# Patient Record
Sex: FEMALE | Race: WHITE
[De-identification: names, ages, dates, MRNs, and addresses within clinical notes are randomized per-mention and may not be internally consistent; named-entity substitution may affect disease eponyms.]

---

## 2019-08-20 ENCOUNTER — HOSPITAL ENCOUNTER (OUTPATIENT)
Dept: HOSPITAL 92 - LABBT | Age: 54
Discharge: HOME | End: 2019-08-20
Attending: SURGERY
Payer: COMMERCIAL

## 2019-08-20 DIAGNOSIS — Z01.818: Primary | ICD-10-CM

## 2019-08-20 DIAGNOSIS — K80.20: ICD-10-CM

## 2019-08-20 LAB
ALBUMIN SERPL BCG-MCNC: 4.5 G/DL (ref 3.5–5)
ALP SERPL-CCNC: 87 U/L (ref 40–150)
ALT SERPL W P-5'-P-CCNC: 14 U/L (ref 8–55)
AST SERPL-CCNC: 17 U/L (ref 5–34)
BILIRUB DIRECT SERPL-MCNC: 0.2 MG/DL (ref 0.1–0.3)
BILIRUB SERPL-MCNC: 0.6 MG/DL (ref 0.2–1.2)

## 2019-08-20 PROCEDURE — 93005 ELECTROCARDIOGRAM TRACING: CPT

## 2019-08-20 PROCEDURE — 93010 ELECTROCARDIOGRAM REPORT: CPT

## 2019-08-20 PROCEDURE — 80076 HEPATIC FUNCTION PANEL: CPT

## 2019-08-22 ENCOUNTER — HOSPITAL ENCOUNTER (OUTPATIENT)
Dept: HOSPITAL 92 - SDC | Age: 54
Discharge: HOME | End: 2019-08-22
Attending: SURGERY
Payer: COMMERCIAL

## 2019-08-22 VITALS — BODY MASS INDEX: 24.5 KG/M2

## 2019-08-22 DIAGNOSIS — Z88.5: ICD-10-CM

## 2019-08-22 DIAGNOSIS — K80.10: Primary | ICD-10-CM

## 2019-08-22 DIAGNOSIS — Z79.899: ICD-10-CM

## 2019-08-22 PROCEDURE — 88304 TISSUE EXAM BY PATHOLOGIST: CPT

## 2019-08-22 PROCEDURE — 0FT44ZZ RESECTION OF GALLBLADDER, PERCUTANEOUS ENDOSCOPIC APPROACH: ICD-10-PCS | Performed by: SURGERY

## 2019-08-22 NOTE — OP
DATE OF PROCEDURE:  08/22/2019



PREOPERATIVE DIAGNOSIS:  Symptomatic cholelithiasis.



PROCEDURE PERFORMED:  Laparoscopic cholecystectomy.



INDICATIONS:  A 54-year-old female, who has been having severe epigastric pain to

the back.  Ultrasound showed cholelithiasis. 



FINDINGS:  She had hydrops of the gallbladder with multiple large stones, small

caliber cystic duct, distention of the gallbladder. 



DESCRIPTION OF PROCEDURE:  After informed consent was obtained, the patient was

taken to the operating room, given general endotracheal anesthesia, placed in the

supine position.  Abdomen was prepped and draped in usual fashion. Local anesthesia

was infiltrated subcutaneously and deep.  A subumbilical incision was performed.

Subcu divided sharply.  The fascia grasped and 2 stay sutures of 0 Vicryl placed on

each side of midline.  Midline incised.  Digital palpation revealed no local

adhesions.  A blunt 12 mm trocar inserted, pneumoperitoneum was created to a

pressure of 15 mmHg.  A 0-degree laparoscope was inserted under direct vision.

Three 5 mm ports were placed subcostally.  Gallbladder was distended.  It was

grasped, advanced superiorly, peritoneum dissected distally to dissect out the

cystic duct and cystic artery in critical view.  The duct and artery were triply

ligated with hemoclips and divided.  The gallbladder removed from its fossa

utilizing electrocautery, removed from the abdomen through the umbilical port, and

sent to Pathology for further analysis.  Hemostasis achieved with electrocautery.

The abdomen irrigated.  Irrigation fluid removed.  Trocars and retractors removed.

The fascia closed with interrupted 0 Vicryl suture.  The skin closed with

interrupted 4-0 Rapide.  Dermabond applied.  The patient tolerated the procedure

well, transferred to Recovery in good condition.  Sponge and needle count verified

correct x2. 







Job ID:  [id number]

## 2019-08-23 ENCOUNTER — HOSPITAL ENCOUNTER (EMERGENCY)
Dept: HOSPITAL 92 - SCSER | Age: 54
Discharge: HOME | End: 2019-08-23
Payer: COMMERCIAL

## 2019-08-23 DIAGNOSIS — R33.9: Primary | ICD-10-CM

## 2019-08-23 LAB — RBC UR QL AUTO: (no result) HPF (ref 0–3)

## 2019-08-23 PROCEDURE — 81015 MICROSCOPIC EXAM OF URINE: CPT

## 2019-08-23 PROCEDURE — 81003 URINALYSIS AUTO W/O SCOPE: CPT

## 2019-08-23 PROCEDURE — 51701 INSERT BLADDER CATHETER: CPT

## 2020-05-18 ENCOUNTER — HOSPITAL ENCOUNTER (OUTPATIENT)
Dept: HOSPITAL 92 - SCSULT | Age: 55
Discharge: HOME | End: 2020-05-18
Attending: FAMILY MEDICINE
Payer: COMMERCIAL

## 2020-05-18 DIAGNOSIS — Z90.710: ICD-10-CM

## 2020-05-18 DIAGNOSIS — R10.2: Primary | ICD-10-CM

## 2020-05-18 PROCEDURE — 76856 US EXAM PELVIC COMPLETE: CPT

## 2020-05-18 NOTE — ULT
Pelvic sonogram transabdominal and transvaginal imaging



HISTORY: Pelvic pain.



FINDINGS: Urinary bladder is incompletely distended. No focal lesion.



Uterus and ovaries are surgically absent.



No pelvic mass or free fluid visible.



  



IMPRESSION :

Status post hysterectomy. No abnormalities are demonstrated.



Reported By: YAKELIN Britt 

Electronically Signed:  5/18/2020 10:37 AM

## 2021-05-19 ENCOUNTER — HOSPITAL ENCOUNTER (OUTPATIENT)
Dept: HOSPITAL 92 - LABBT | Age: 56
Discharge: HOME | End: 2021-05-19
Attending: SURGERY
Payer: COMMERCIAL

## 2021-05-19 DIAGNOSIS — Z01.818: Primary | ICD-10-CM

## 2021-05-19 DIAGNOSIS — D17.1: ICD-10-CM

## 2021-05-19 DIAGNOSIS — Z20.822: ICD-10-CM

## 2021-05-19 LAB
ALBUMIN SERPL BCG-MCNC: 4.4 G/DL (ref 3.5–5)
ALP SERPL-CCNC: 84 U/L (ref 40–110)
ALT SERPL W P-5'-P-CCNC: 13 U/L (ref 8–55)
ANION GAP SERPL CALC-SCNC: 12 MMOL/L (ref 10–20)
AST SERPL-CCNC: 22 U/L (ref 5–34)
BASOPHILS # BLD AUTO: 0.1 10X3/UL (ref 0–0.2)
BASOPHILS NFR BLD AUTO: 0.7 % (ref 0–2)
BILIRUB SERPL-MCNC: 0.5 MG/DL (ref 0.2–1.2)
BUN SERPL-MCNC: 11 MG/DL (ref 9.8–20.1)
CALCIUM SERPL-MCNC: 9.6 MG/DL (ref 7.8–10.44)
CHLORIDE SERPL-SCNC: 105 MMOL/L (ref 98–107)
CO2 SERPL-SCNC: 27 MMOL/L (ref 22–29)
CREAT CL PREDICTED SERPL C-G-VRATE: 0 ML/MIN (ref 70–130)
EOSINOPHIL # BLD AUTO: 0.3 10X3/UL (ref 0–0.5)
EOSINOPHIL NFR BLD AUTO: 3.7 % (ref 0–6)
GLOBULIN SER CALC-MCNC: 2.6 G/DL (ref 2.4–3.5)
GLUCOSE SERPL-MCNC: 65 MG/DL (ref 70–105)
HGB BLD-MCNC: 13.3 G/DL (ref 12–15.5)
LYMPHOCYTES NFR BLD AUTO: 26.4 % (ref 18–47)
MCH RBC QN AUTO: 31.5 PG (ref 27–33)
MCV RBC AUTO: 92.9 FL (ref 81.6–98.3)
MONOCYTES # BLD AUTO: 0.6 10X3/UL (ref 0–1.1)
MONOCYTES NFR BLD AUTO: 7.3 % (ref 0–10)
NEUTROPHILS # BLD AUTO: 4.6 10X3/UL (ref 1.5–8.4)
NEUTROPHILS NFR BLD AUTO: 61.4 % (ref 40–75)
PLATELET # BLD AUTO: 275 10X3/UL (ref 150–450)
POTASSIUM SERPL-SCNC: 4.3 MMOL/L (ref 3.5–5.1)
RBC # BLD AUTO: 4.22 10X6/UL (ref 3.9–5.03)
SODIUM SERPL-SCNC: 140 MMOL/L (ref 136–145)
WBC # BLD AUTO: 7.5 10X3/UL (ref 3.5–10.5)

## 2021-05-19 PROCEDURE — 93010 ELECTROCARDIOGRAM REPORT: CPT

## 2021-05-19 PROCEDURE — 87635 SARS-COV-2 COVID-19 AMP PRB: CPT

## 2021-05-19 PROCEDURE — U0003 INFECTIOUS AGENT DETECTION BY NUCLEIC ACID (DNA OR RNA); SEVERE ACUTE RESPIRATORY SYNDROME CORONAVIRUS 2 (SARS-COV-2) (CORONAVIRUS DISEASE [COVID-19]), AMPLIFIED PROBE TECHNIQUE, MAKING USE OF HIGH THROUGHPUT TECHNOLOGIES AS DESCRIBED BY CMS-2020-01-R: HCPCS

## 2021-05-19 PROCEDURE — 85025 COMPLETE CBC W/AUTO DIFF WBC: CPT

## 2021-05-19 PROCEDURE — 93005 ELECTROCARDIOGRAM TRACING: CPT

## 2021-05-19 PROCEDURE — U0005 INFEC AGEN DETEC AMPLI PROBE: HCPCS

## 2021-05-19 PROCEDURE — 80053 COMPREHEN METABOLIC PANEL: CPT

## 2021-05-24 ENCOUNTER — HOSPITAL ENCOUNTER (OUTPATIENT)
Dept: HOSPITAL 92 - SDC | Age: 56
Discharge: HOME | End: 2021-05-24
Attending: SURGERY
Payer: COMMERCIAL

## 2021-05-24 VITALS — BODY MASS INDEX: 25.4 KG/M2

## 2021-05-24 DIAGNOSIS — Z88.5: ICD-10-CM

## 2021-05-24 DIAGNOSIS — D17.1: Primary | ICD-10-CM

## 2021-05-24 PROCEDURE — S0020 INJECTION, BUPIVICAINE HYDRO: HCPCS

## 2021-05-24 PROCEDURE — 88304 TISSUE EXAM BY PATHOLOGIST: CPT

## 2021-05-24 PROCEDURE — 0JB70ZZ EXCISION OF BACK SUBCUTANEOUS TISSUE AND FASCIA, OPEN APPROACH: ICD-10-PCS | Performed by: SURGERY

## 2021-05-26 ENCOUNTER — HOSPITAL ENCOUNTER (OUTPATIENT)
Dept: HOSPITAL 92 - SDC | Age: 56
Discharge: HOME | End: 2021-05-26
Attending: NEUROLOGICAL SURGERY
Payer: COMMERCIAL

## 2021-05-26 VITALS — BODY MASS INDEX: 25.1 KG/M2

## 2021-05-26 DIAGNOSIS — Z88.5: ICD-10-CM

## 2021-05-26 DIAGNOSIS — J30.2: ICD-10-CM

## 2021-05-26 DIAGNOSIS — G56.02: Primary | ICD-10-CM

## 2021-05-26 PROCEDURE — 01N50ZZ RELEASE MEDIAN NERVE, OPEN APPROACH: ICD-10-PCS | Performed by: NEUROLOGICAL SURGERY

## 2022-12-27 ENCOUNTER — HOSPITAL ENCOUNTER (OUTPATIENT)
Dept: HOSPITAL 92 - SCSMRI | Age: 57
Discharge: HOME | End: 2022-12-27
Attending: ORTHOPAEDIC SURGERY
Payer: COMMERCIAL

## 2022-12-27 DIAGNOSIS — M65.842: ICD-10-CM

## 2022-12-27 DIAGNOSIS — M85.642: Primary | ICD-10-CM

## 2023-04-06 ENCOUNTER — HOSPITAL ENCOUNTER (OUTPATIENT)
Dept: HOSPITAL 92 - SCSMRI | Age: 58
Discharge: HOME | End: 2023-04-06
Attending: ORTHOPAEDIC SURGERY
Payer: COMMERCIAL

## 2023-04-06 DIAGNOSIS — M23.91: Primary | ICD-10-CM

## 2023-12-19 ENCOUNTER — HOSPITAL ENCOUNTER (OUTPATIENT)
Dept: HOSPITAL 92 - CSHMAMMO | Age: 58
Discharge: HOME | End: 2023-12-19
Attending: FAMILY MEDICINE
Payer: COMMERCIAL

## 2023-12-19 DIAGNOSIS — Z80.3: ICD-10-CM

## 2023-12-19 DIAGNOSIS — Z12.31: Primary | ICD-10-CM

## 2023-12-19 PROCEDURE — 77063 BREAST TOMOSYNTHESIS BI: CPT

## 2023-12-19 PROCEDURE — 77067 SCR MAMMO BI INCL CAD: CPT

## 2024-12-20 ENCOUNTER — HOSPITAL ENCOUNTER (OUTPATIENT)
Dept: HOSPITAL 92 - CSHMAMMO | Age: 59
Discharge: HOME | End: 2024-12-20
Attending: FAMILY MEDICINE
Payer: COMMERCIAL

## 2024-12-20 DIAGNOSIS — Z80.3: ICD-10-CM

## 2024-12-20 DIAGNOSIS — Z12.31: Primary | ICD-10-CM

## 2024-12-20 PROCEDURE — 77063 BREAST TOMOSYNTHESIS BI: CPT

## 2024-12-20 PROCEDURE — 77067 SCR MAMMO BI INCL CAD: CPT
